# Patient Record
Sex: MALE
[De-identification: names, ages, dates, MRNs, and addresses within clinical notes are randomized per-mention and may not be internally consistent; named-entity substitution may affect disease eponyms.]

---

## 2022-09-02 PROBLEM — Z00.00 ENCOUNTER FOR PREVENTIVE HEALTH EXAMINATION: Status: ACTIVE | Noted: 2022-09-02

## 2022-09-07 ENCOUNTER — APPOINTMENT (OUTPATIENT)
Dept: UROLOGY | Facility: CLINIC | Age: 77
End: 2022-09-07

## 2022-09-07 VITALS
HEIGHT: 70 IN | BODY MASS INDEX: 25.05 KG/M2 | TEMPERATURE: 97.6 F | DIASTOLIC BLOOD PRESSURE: 67 MMHG | OXYGEN SATURATION: 97 % | HEART RATE: 74 BPM | WEIGHT: 175 LBS | SYSTOLIC BLOOD PRESSURE: 161 MMHG

## 2022-09-07 PROCEDURE — 99204 OFFICE O/P NEW MOD 45 MIN: CPT

## 2022-09-07 NOTE — LETTER BODY
[Dear  ___] : Dear  [unfilled], [Consult Letter:] : I had the pleasure of evaluating your patient, [unfilled]. [Please see my note below.] : Please see my note below. [Consult Closing:] : Thank you very much for allowing me to participate in the care of this patient.  If you have any questions, please do not hesitate to contact me. [Sincerely,] : Sincerely, [FreeTextEntry3] : Johnna Agee MD\par System Director [facility] [place]\par Department of Urology\par [facility] \par   at [facility]\par  of Urology\par [facility] [employer] at [facility]\par

## 2022-09-07 NOTE — HISTORY OF PRESENT ILLNESS
[FreeTextEntry1] : 77 yr old male with CC of significant stress incontinence x 2-3 years. HE is s/p open radical prostatectomy in 2007 and subsequent salvage radiation x 39 sessions. Here to discuss possible AUS placement, referred by Dr. August Garza.\par \par He rarely makes it to bathroom without leaking his whole bladder. He wears 4-5 diapers/day. He has not had any success with anticholinergic or desmopressin. He wakes up wet. \par \par He has history of urinary stricture disease, bladder neck contracture. 10/2021 had resection of bladder neck contracture and now self-caths weekly. No urinary stream issues. Denies dysuria or gross hematuria. Last PSA 0.8 \par \par He was hospitalized at [facility] 3/2022, 4/2022 with TIA and then urinary sepsis. \par \par PVR: 120 cc \par \par \par \par PMH: prostate cancer 2007- open radical prostatectomy, T2DM last A1c 6.4, HTN, TIA march 2022 \par PSH: cholecystectomy, left knee replacement 8/10/21, \par FH: \par Meds:\par Allergies: NKDA \par Social: nonsmoker, no alcohol \par \par PCP: Dr Garza primary urologist \par accompanied by 2 daughters- has 5 children,  \par \par Lives in [place]

## 2022-09-07 NOTE — PHYSICAL EXAM
[General Appearance - Well Developed] : well developed [General Appearance - Well Nourished] : well nourished [Normal Appearance] : normal appearance [Well Groomed] : well groomed [General Appearance - In No Acute Distress] : no acute distress [Edema] : no peripheral edema [Respiration, Rhythm And Depth] : normal respiratory rhythm and effort [Exaggerated Use Of Accessory Muscles For Inspiration] : no accessory muscle use [Normal Station and Gait] : the gait and station were normal for the patient's age [] : no rash [FreeTextEntry1] : forgetful

## 2022-09-07 NOTE — ASSESSMENT
[FreeTextEntry1] : \par \par Impression/plan: 77 yr old male presents with moderate-severe stress incontinence s/p open radical prostatectomy and salvage radiation. His situation is complicated by bladder neck contracture and self catheterizations weekly. Had extensive discussion with patient and 2 daughter regarding procedure. \par \par 1. UCx\par 2. UDS\par 3. Cysto \par 4. RTC 1 week for cysto and UDS on same day \par \par I, Dr. Johnna Agee, personally performed the evaluation and management (E/M) services for this new patient.  That E/M includes conducting the clinically appropriate initial history &/or exam, assessing all conditions, and establishing the plan of care.  Today, my MEY, was here to observe &/or participate in the visit & follow plan of care established by me.\par \par

## 2022-09-09 LAB — BACTERIA UR CULT: NORMAL

## 2022-09-19 ENCOUNTER — APPOINTMENT (OUTPATIENT)
Dept: UROLOGY | Facility: CLINIC | Age: 77
End: 2022-09-19

## 2022-09-19 VITALS — SYSTOLIC BLOOD PRESSURE: 145 MMHG | HEART RATE: 72 BPM | DIASTOLIC BLOOD PRESSURE: 78 MMHG | OXYGEN SATURATION: 97 %

## 2022-09-19 LAB
BILIRUB UR QL STRIP: NORMAL
CLARITY UR: CLEAR
COLLECTION METHOD: NORMAL
GLUCOSE UR-MCNC: NORMAL
HCG UR QL: 0.2 EU/DL
HGB UR QL STRIP.AUTO: NORMAL
KETONES UR-MCNC: NORMAL
LEUKOCYTE ESTERASE UR QL STRIP: NORMAL
NITRITE UR QL STRIP: NORMAL
PH UR STRIP: 6
PROT UR STRIP-MCNC: NORMAL
SP GR UR STRIP: 1.02

## 2022-09-19 PROCEDURE — 51741 ELECTRO-UROFLOWMETRY FIRST: CPT

## 2022-09-19 PROCEDURE — 99215 OFFICE O/P EST HI 40 MIN: CPT | Mod: 25

## 2022-09-19 PROCEDURE — 51728 CYSTOMETROGRAM W/VP: CPT

## 2022-09-19 PROCEDURE — 51798 US URINE CAPACITY MEASURE: CPT

## 2022-09-19 PROCEDURE — 51797 INTRAABDOMINAL PRESSURE TEST: CPT

## 2022-09-19 PROCEDURE — 81003 URINALYSIS AUTO W/O SCOPE: CPT | Mod: QW

## 2022-09-19 PROCEDURE — 51784 ANAL/URINARY MUSCLE STUDY: CPT

## 2022-09-22 NOTE — ASSESSMENT
[FreeTextEntry1] : \par \par Impression/plan: 77 yr old male presents with moderate-severe stress incontinence s/p open radical prostatectomy and salvage radiation. His situation is complicated by bladder neck contracture and self catheterizations weekly. Had extensive discussion with patient and 2 daughter regarding procedure. \par \par 1.  Given cystoscopic findings of a bladder neck contracture with concomitant very poor outlet competence with evidence of low leak point pressure measurements for his stress urinary incontinence, this is quite a complex scenario.  I did explain to him that eventually the best aunt incontinence procedure for him given the severity of his incontinence is an artificial urinary sphincter.  Prior to this being entertained, however, he would need to have this bladder neck contracture definitively treated and maintain patency before that type of aunt incontinence procedure could be performed.  I given him the name of my partner Dr. Palumbo to further discuss his bladder neck contracture management in anticipation of correcting his incontinence in the future.  All questions from both the patient as well as his daughter were answered at length.\par

## 2022-09-22 NOTE — PHYSICAL EXAM
[General Appearance - Well Developed] : well developed [General Appearance - Well Nourished] : well nourished [Normal Appearance] : normal appearance [Well Groomed] : well groomed [General Appearance - In No Acute Distress] : no acute distress [Abdomen Soft] : soft [Abdomen Tenderness] : non-tender [Costovertebral Angle Tenderness] : no ~M costovertebral angle tenderness [] : no respiratory distress [Respiration, Rhythm And Depth] : normal respiratory rhythm and effort [Exaggerated Use Of Accessory Muscles For Inspiration] : no accessory muscle use [Oriented To Time, Place, And Person] : oriented to person, place, and time [Normal Station and Gait] : the gait and station were normal for the patient's age

## 2022-09-22 NOTE — HISTORY OF PRESENT ILLNESS
[FreeTextEntry1] : 77 yr old male with CC of significant stress incontinence x 2-3 years. HE is s/p open radical prostatectomy in 2007 and subsequent salvage radiation x 39 sessions. Here to discuss possible AUS placement, referred by Dr. August Garza.\par \par He rarely makes it to bathroom without leaking his whole bladder. He wears 4-5 diapers/day. He has not had any success with anticholinergic or desmopressin. He wakes up wet. \par \par He has history of urinary stricture disease, bladder neck contracture. 10/2021 had resection of bladder neck contracture and now self-caths weekly. No urinary stream issues. Denies dysuria or gross hematuria. Last PSA 0.8 \par \par He was hospitalized at [facility] 3/2022, 4/2022 with TIA and then urinary sepsis. \par \par PVR: 120 cc \par \par \par \par PMH: prostate cancer 2007- open radical prostatectomy, T2DM last A1c 6.4, HTN, TIA march 2022 \par PSH: cholecystectomy, left knee replacement 8/10/21, \par FH: \par Meds:\par Allergies: NKDA \par Social: nonsmoker, no alcohol \par \par PCP: Dr Garza primary urologist \par accompanied by 2 daughters- has 5 children,  \par \par Lives in [place] \par \par 9/19/22\par \par Patient is here today for further evaluation with a cystoscopy and urodynamic study as well as discussion of treatment options for his incontinence.  He has had no interval change in medical surgical history since his last office visit.\par \par Cystoscopy reveals bladder neck contracture, unable to accommodate the cystoscope.  He does appear quite dense.\par \par UDS - \par \par Filling/Storage Phase: First sensation 175 mL, First desire 286 mL, Normal desire 314 mL and Cystometric capacity 369 mL. Involuntary contractions were not present . Pt demonstrated stress urinary incontinence. Pt did not demonstrate urge urinary incontinence. VLPP 39 cm/H20. Compliance: normal. EMG Activity: normal. \par \par Voiding Phase: Qmax 5.3 mL/s , Pdet at Qmax 29.5 cm/H20, Qmax at Pdet 2.2 mL/s, Pavg 14.7 cm/H20, Qavg 2.9 mL/s, voiding time 2:18 mm:sec, voided volume 316 mL and post void residual 51 mL. EMG activity was synergic. \par \par Urodynamic Interpretation : \par Normal bladder sensation. Normal bladder capacity. Patient experiencing no detrusor instability. The patient has moderate  stress incontinence. The uroflow rate is decreased. The uroflow pattern is plateaued. The detrusor contractility is normal. The patient has bladder outlet obstruction. The intravesical voiding pressure is normal. The patient has incomplete bladder emptying, a post void residual of 51 cc. The spincter activity is normal synergic. \par Additional Procedure Related Findings/Comments: BOOI: 18.9.

## 2022-09-22 NOTE — LETTER BODY
[Dear  ___] : Dear  [unfilled], [Courtesy Letter:] : I had the pleasure of seeing your patient, [unfilled], in my office today. [Please see my note below.] : Please see my note below. [Consult Closing:] : Thank you very much for allowing me to participate in the care of this patient.  If you have any questions, please do not hesitate to contact me. [Sincerely,] : Sincerely, [FreeTextEntry3] : Johnna Agee MD\par System Director [facility]\par Department of Urology\par [facility] \par   at [facility]\par  of Urology\par [facility] [employer] at [facility]\par

## 2022-10-17 ENCOUNTER — APPOINTMENT (OUTPATIENT)
Dept: UROLOGY | Facility: CLINIC | Age: 77
End: 2022-10-17

## 2022-10-17 VITALS
HEIGHT: 70 IN | BODY MASS INDEX: 25.05 KG/M2 | TEMPERATURE: 98.3 F | WEIGHT: 175 LBS | HEART RATE: 69 BPM | DIASTOLIC BLOOD PRESSURE: 63 MMHG | SYSTOLIC BLOOD PRESSURE: 157 MMHG

## 2022-10-17 DIAGNOSIS — N39.0 URINARY TRACT INFECTION, SITE NOT SPECIFIED: ICD-10-CM

## 2022-10-17 DIAGNOSIS — I10 ESSENTIAL (PRIMARY) HYPERTENSION: ICD-10-CM

## 2022-10-17 DIAGNOSIS — Z87.891 PERSONAL HISTORY OF NICOTINE DEPENDENCE: ICD-10-CM

## 2022-10-17 DIAGNOSIS — K21.9 GASTRO-ESOPHAGEAL REFLUX DISEASE W/OUT ESOPHAGITIS: ICD-10-CM

## 2022-10-17 DIAGNOSIS — N39.3 STRESS INCONTINENCE (FEMALE) (MALE): ICD-10-CM

## 2022-10-17 DIAGNOSIS — R39.9 UNSPECIFIED SYMPTOMS AND SIGNS INVOLVING THE GENITOURINARY SYSTEM: ICD-10-CM

## 2022-10-17 DIAGNOSIS — N35.919 UNSPECIFIED URETHRAL STRICTURE, MALE, UNSPECIFIED SITE: ICD-10-CM

## 2022-10-17 DIAGNOSIS — E78.5 HYPERLIPIDEMIA, UNSPECIFIED: ICD-10-CM

## 2022-10-17 DIAGNOSIS — C61 MALIGNANT NEOPLASM OF PROSTATE: ICD-10-CM

## 2022-10-17 DIAGNOSIS — E87.1 HYPO-OSMOLALITY AND HYPONATREMIA: ICD-10-CM

## 2022-10-17 DIAGNOSIS — N32.0 BLADDER-NECK OBSTRUCTION: ICD-10-CM

## 2022-10-17 DIAGNOSIS — E11.9 TYPE 2 DIABETES MELLITUS W/OUT COMPLICATIONS: ICD-10-CM

## 2022-10-17 DIAGNOSIS — R41.3 OTHER AMNESIA: ICD-10-CM

## 2022-10-17 PROCEDURE — 51798 US URINE CAPACITY MEASURE: CPT

## 2022-10-17 PROCEDURE — 99215 OFFICE O/P EST HI 40 MIN: CPT

## 2022-10-17 RX ORDER — METFORMIN HYDROCHLORIDE 500 MG/1
500 TABLET, COATED ORAL
Refills: 0 | Status: ACTIVE | COMMUNITY

## 2022-10-17 RX ORDER — AMLODIPINE BESYLATE 5 MG/1
5 TABLET ORAL
Refills: 0 | Status: ACTIVE | COMMUNITY

## 2022-10-17 RX ORDER — ATORVASTATIN CALCIUM 40 MG/1
40 TABLET, FILM COATED ORAL
Refills: 0 | Status: ACTIVE | COMMUNITY

## 2022-10-17 RX ORDER — ESCITALOPRAM OXALATE 20 MG/1
20 TABLET ORAL
Refills: 0 | Status: ACTIVE | COMMUNITY

## 2022-10-17 RX ORDER — LOSARTAN POTASSIUM 50 MG/1
50 TABLET, FILM COATED ORAL
Refills: 0 | Status: ACTIVE | COMMUNITY

## 2022-10-17 RX ORDER — PANTOPRAZOLE 20 MG/1
20 TABLET, DELAYED RELEASE ORAL
Refills: 0 | Status: ACTIVE | COMMUNITY

## 2022-10-17 RX ORDER — ASPIRIN 81 MG
81 TABLET, DELAYED RELEASE (ENTERIC COATED) ORAL
Refills: 0 | Status: ACTIVE | COMMUNITY

## 2022-10-17 NOTE — PHYSICAL EXAM
[General Appearance - Well Developed] : well developed [General Appearance - Well Nourished] : well nourished [Normal Appearance] : normal appearance [Well Groomed] : well groomed [General Appearance - In No Acute Distress] : no acute distress [Abdomen Soft] : soft [Abdomen Tenderness] : non-tender [Costovertebral Angle Tenderness] : no ~M costovertebral angle tenderness [Urethral Meatus] : meatus normal [Urinary Bladder Findings] : the bladder was normal on palpation [Scrotum] : the scrotum was normal [Testes Mass (___cm)] : there were no testicular masses [No Prostate Nodules] : no prostate nodules [Edema] : no peripheral edema [] : no respiratory distress [Respiration, Rhythm And Depth] : normal respiratory rhythm and effort [Exaggerated Use Of Accessory Muscles For Inspiration] : no accessory muscle use [Oriented To Time, Place, And Person] : oriented to person, place, and time [Affect] : the affect was normal [Mood] : the mood was normal [Not Anxious] : not anxious [Normal Station and Gait] : the gait and station were normal for the patient's age [No Focal Deficits] : no focal deficits [No Palpable Adenopathy] : no palpable adenopathy [Abdomen Mass (___ Cm)] : no abdominal mass palpated [Abdomen Hernia] : no hernia was discovered [Penis Abnormality] : normal circumcised penis [Testes Tenderness] : no tenderness of the testes [Skin Color & Pigmentation] : normal skin color and pigmentation [Heart Rate And Rhythm] : Heart rate and rhythm were normal

## 2022-10-17 NOTE — ASSESSMENT
[FreeTextEntry1] : I discussed the findings and options with Mr. CESAR RAPP and his daughters in great detail:\par \par 1. Continue as is, with the possible addition of a Cunningham clamp or a condom catheter\par 2. Address the bladder neck stenosis with an endoscopic approach (which has failed in the past) and then consider placing an artificial urinary sphincter\par 3. Perform a formal pull-through/re-anastomotic type urethrovesical reconstruction and then place an artificial urinary sphincter. \par \par I outlined the pros and cons of these options and stressed the fact that the complication rate with an artificial sphincter is significantly higher in light of his prior radiation therapy.\par \par Mr. Rapp will decide on how he wishes to proceed. He will continue with weekly urethral dilations using the 14 Maori catheter.  I have also provided him with a prescription for a Cunningham clamp.  \par \par I encouraged him to follow-up with his usual urologist, Dr. August Garza. \par \par

## 2022-10-17 NOTE — HISTORY OF PRESENT ILLNESS
[FreeTextEntry1] : Mr. CESAR RAPP, a patient of Dr. Johnna Agee, comes in today for a urologic follow-up. He was diagnosed with prostate cancer in 2007 and underwent an open radical prostatectomy.  In ?2008 he had 39 sessions of external beam radiation therapy (EBRT) because of a biochemical recurrence (no records are available for my review).\par \par Since the EBRT Mr. Rapp has been experiencing significant stress urinary incontinence, which has increased in the past 3 years.  He also has nocturnal enuresis.  He now wears 4-5 diapers per day. He has tried Vesicare and DDAVP in the past with no subjective improvement.  Mr. Rapp underwent an attempted endoscopic resection of the bladder neck contracture on August 8, 2021 with Dr. August Garza and has been self catheterizing weekly with a 14 Fr catheter; he reports that the catheter advances easily.  In 2012 he had, what sounds like, a similar procedure.  In Sept 2022 he underwent a cystoscopy (which was aborted due to a significant bladder neck contracture) and urodynamics, which demonstrated moderate stress incontinence\par \par Mr. Rapp was hospitalized in February 2022 for hyponatremia and sepsis. \par IPSS: 23/35\par Sono (performed to assess bladder emptying): nil PVR\par \par PSAs: 9/7/22--0.6; \par \par CT: 3/7/22--Simple left renal cysts. 1mm nonobstructing right renal stone. Status post prostatectomy; \par \par MRI Abdomen: 4/13/22--No evidence of a right renal mass. Small left renal cysts. 8mm nonenhancing cystic abnormality in the pancreatic tail. \par \par 
6

## 2022-10-17 NOTE — ADDENDUM
[FreeTextEntry1] : A portion of this note was written by [Abundio Ignacio] on 10/17/2022 acting as a scribe for Dr. Palumbo. \par \par I have personally reviewed the chart and agree that the record accurately reflects my personal performance of the history, physical exam, assessment, and plan.

## 2022-10-17 NOTE — LETTER BODY
[Dear  ___] : Dear  [unfilled], [Consult Letter:] : I had the pleasure of evaluating your patient, [unfilled]. [Please see my note below.] : Please see my note below. [Consult Closing:] : Thank you very much for allowing me to participate in the care of this patient.  If you have any questions, please do not hesitate to contact me. [Sincerely,] : Sincerely, [DrMelissa  ___] : Dr. SANCHEZ [FreeTextEntry3] : Frank Palumbo MD, FACS

## 2022-10-19 LAB — BACTERIA UR CULT: NORMAL
